# Patient Record
Sex: MALE | Race: BLACK OR AFRICAN AMERICAN | URBAN - METROPOLITAN AREA
[De-identification: names, ages, dates, MRNs, and addresses within clinical notes are randomized per-mention and may not be internally consistent; named-entity substitution may affect disease eponyms.]

---

## 2023-10-02 ENCOUNTER — LAB OUTSIDE AN ENCOUNTER (OUTPATIENT)
Dept: URBAN - METROPOLITAN AREA CLINIC 52 | Facility: CLINIC | Age: 68
End: 2023-10-02

## 2023-10-02 ENCOUNTER — OFFICE VISIT (OUTPATIENT)
Dept: URBAN - METROPOLITAN AREA CLINIC 52 | Facility: CLINIC | Age: 68
End: 2023-10-02
Payer: COMMERCIAL

## 2023-10-02 ENCOUNTER — WEB ENCOUNTER (OUTPATIENT)
Dept: URBAN - METROPOLITAN AREA CLINIC 52 | Facility: CLINIC | Age: 68
End: 2023-10-02

## 2023-10-02 ENCOUNTER — DASHBOARD ENCOUNTERS (OUTPATIENT)
Age: 68
End: 2023-10-02

## 2023-10-02 VITALS
OXYGEN SATURATION: 97 % | DIASTOLIC BLOOD PRESSURE: 68 MMHG | TEMPERATURE: 97.5 F | WEIGHT: 177.4 LBS | BODY MASS INDEX: 24.84 KG/M2 | HEIGHT: 71 IN | SYSTOLIC BLOOD PRESSURE: 120 MMHG | HEART RATE: 76 BPM

## 2023-10-02 DIAGNOSIS — R10.31 RLQ ABDOMINAL PAIN: ICD-10-CM

## 2023-10-02 PROCEDURE — 99204 OFFICE O/P NEW MOD 45 MIN: CPT | Performed by: INTERNAL MEDICINE

## 2023-10-02 RX ORDER — AMLODIPINE BESYLATE 10 MG/1
1 TABLET TABLET ORAL ONCE A DAY
Status: ACTIVE | COMMUNITY

## 2023-10-02 RX ORDER — DONEPEZIL HYDROCHLORIDE 5 MG/1
1 TABLET AT BEDTIME TABLET, FILM COATED ORAL ONCE A DAY
Status: ON HOLD | COMMUNITY

## 2023-10-02 NOTE — PHYSICAL EXAM GASTROINTESTINAL
Abdomen , soft, mildly tender RLQ, nondistended , no guarding or rigidity , no masses palpable , normal bowel sounds , Liver and Spleen , no hepatomegaly present , no hepatosplenomegaly , liver nontender , spleen not palpable

## 2023-10-02 NOTE — PHYSICAL EXAM SKIN:
no rashes , no jaundice present , good turgor , no masses , no tenderness on palpation normal/regular rate and rhythm/no murmur/no pedal edema/vascular

## 2023-10-02 NOTE — HPI-TODAY'S VISIT:
Low abdominal pain. Occas to Constant soreness. Moderate No diarrhea, constipation, BRB. Exac-? Allev-? HX of prostate resection for cancer, (2019) and implant surgery [id number].

## 2023-10-06 LAB
A/G RATIO: 1.3
ALBUMIN: 4.3
ALKALINE PHOSPHATASE: 71
ALT (SGPT): 16
AST (SGOT): 23
BILIRUBIN, TOTAL: 0.5
BUN/CREATININE RATIO: (no result)
BUN: 18
C-REACTIVE PROTEIN, QUANT: 6.8
CALCIUM: 9.6
CARBON DIOXIDE, TOTAL: 24
CHLORIDE: 108
CREATININE: 1.15
EGFR: 70
GLOBULIN, TOTAL: 3.4
GLUCOSE: 88
HEMATOCRIT: 41.3
HEMOGLOBIN: 13.6
MCH: 30.5
MCHC: 32.9
MCV: 92.6
MPV: 10.7
PLATELET COUNT: 314
POTASSIUM: 4.6
PROTEIN, TOTAL: 7.7
RDW: 11.6
RED BLOOD CELL COUNT: 4.46
SODIUM: 144
WHITE BLOOD CELL COUNT: 4.5

## 2023-11-29 ENCOUNTER — TELEPHONE ENCOUNTER (OUTPATIENT)
Dept: URBAN - METROPOLITAN AREA CLINIC 52 | Facility: CLINIC | Age: 68
End: 2023-11-29